# Patient Record
Sex: FEMALE | Race: WHITE | NOT HISPANIC OR LATINO | URBAN - METROPOLITAN AREA
[De-identification: names, ages, dates, MRNs, and addresses within clinical notes are randomized per-mention and may not be internally consistent; named-entity substitution may affect disease eponyms.]

---

## 2017-11-15 ENCOUNTER — OUTPATIENT (OUTPATIENT)
Dept: OUTPATIENT SERVICES | Facility: HOSPITAL | Age: 52
LOS: 1 days | Discharge: HOME | End: 2017-11-15

## 2017-11-15 DIAGNOSIS — Z12.31 ENCOUNTER FOR SCREENING MAMMOGRAM FOR MALIGNANT NEOPLASM OF BREAST: ICD-10-CM

## 2019-03-15 ENCOUNTER — OUTPATIENT (OUTPATIENT)
Dept: OUTPATIENT SERVICES | Facility: HOSPITAL | Age: 54
LOS: 1 days | Discharge: HOME | End: 2019-03-15

## 2019-03-15 DIAGNOSIS — Z12.31 ENCOUNTER FOR SCREENING MAMMOGRAM FOR MALIGNANT NEOPLASM OF BREAST: ICD-10-CM

## 2020-06-10 ENCOUNTER — OUTPATIENT (OUTPATIENT)
Dept: OUTPATIENT SERVICES | Facility: HOSPITAL | Age: 55
LOS: 1 days | Discharge: HOME | End: 2020-06-10
Payer: COMMERCIAL

## 2020-06-10 DIAGNOSIS — Z12.31 ENCOUNTER FOR SCREENING MAMMOGRAM FOR MALIGNANT NEOPLASM OF BREAST: ICD-10-CM

## 2020-06-10 PROCEDURE — 77067 SCR MAMMO BI INCL CAD: CPT | Mod: 26

## 2020-06-10 PROCEDURE — 77063 BREAST TOMOSYNTHESIS BI: CPT | Mod: 26

## 2021-04-24 ENCOUNTER — EMERGENCY (EMERGENCY)
Facility: HOSPITAL | Age: 56
LOS: 0 days | Discharge: HOME | End: 2021-04-24
Attending: EMERGENCY MEDICINE | Admitting: EMERGENCY MEDICINE
Payer: COMMERCIAL

## 2021-04-24 VITALS
HEART RATE: 100 BPM | SYSTOLIC BLOOD PRESSURE: 134 MMHG | OXYGEN SATURATION: 99 % | TEMPERATURE: 99 F | RESPIRATION RATE: 18 BRPM | DIASTOLIC BLOOD PRESSURE: 72 MMHG

## 2021-04-24 VITALS
DIASTOLIC BLOOD PRESSURE: 70 MMHG | RESPIRATION RATE: 18 BRPM | HEART RATE: 88 BPM | SYSTOLIC BLOOD PRESSURE: 137 MMHG | OXYGEN SATURATION: 99 % | TEMPERATURE: 99 F

## 2021-04-24 DIAGNOSIS — K57.32 DIVERTICULITIS OF LARGE INTESTINE WITHOUT PERFORATION OR ABSCESS WITHOUT BLEEDING: ICD-10-CM

## 2021-04-24 DIAGNOSIS — R10.30 LOWER ABDOMINAL PAIN, UNSPECIFIED: ICD-10-CM

## 2021-04-24 DIAGNOSIS — E78.00 PURE HYPERCHOLESTEROLEMIA, UNSPECIFIED: ICD-10-CM

## 2021-04-24 LAB
ALBUMIN SERPL ELPH-MCNC: 5.1 G/DL — SIGNIFICANT CHANGE UP (ref 3.5–5.2)
ALP SERPL-CCNC: 142 U/L — HIGH (ref 30–115)
ALT FLD-CCNC: 74 U/L — HIGH (ref 0–41)
ANION GAP SERPL CALC-SCNC: 13 MMOL/L — SIGNIFICANT CHANGE UP (ref 7–14)
APPEARANCE UR: CLEAR — SIGNIFICANT CHANGE UP
AST SERPL-CCNC: 73 U/L — HIGH (ref 0–41)
BACTERIA # UR AUTO: NEGATIVE — SIGNIFICANT CHANGE UP
BASOPHILS # BLD AUTO: 0.05 K/UL — SIGNIFICANT CHANGE UP (ref 0–0.2)
BASOPHILS NFR BLD AUTO: 0.6 % — SIGNIFICANT CHANGE UP (ref 0–1)
BILIRUB DIRECT SERPL-MCNC: 0.2 MG/DL — SIGNIFICANT CHANGE UP (ref 0–0.2)
BILIRUB INDIRECT FLD-MCNC: 0.8 MG/DL — SIGNIFICANT CHANGE UP (ref 0.2–1.2)
BILIRUB SERPL-MCNC: 1 MG/DL — SIGNIFICANT CHANGE UP (ref 0.2–1.2)
BILIRUB UR-MCNC: NEGATIVE — SIGNIFICANT CHANGE UP
BUN SERPL-MCNC: 12 MG/DL — SIGNIFICANT CHANGE UP (ref 10–20)
CALCIUM SERPL-MCNC: 9.9 MG/DL — SIGNIFICANT CHANGE UP (ref 8.5–10.1)
CHLORIDE SERPL-SCNC: 101 MMOL/L — SIGNIFICANT CHANGE UP (ref 98–110)
CO2 SERPL-SCNC: 25 MMOL/L — SIGNIFICANT CHANGE UP (ref 17–32)
COLOR SPEC: YELLOW — SIGNIFICANT CHANGE UP
COMMENT - URINE: SIGNIFICANT CHANGE UP
COMMENT - URINE: SIGNIFICANT CHANGE UP
CREAT SERPL-MCNC: 0.7 MG/DL — SIGNIFICANT CHANGE UP (ref 0.7–1.5)
DIFF PNL FLD: NEGATIVE — SIGNIFICANT CHANGE UP
EOSINOPHIL # BLD AUTO: 0.09 K/UL — SIGNIFICANT CHANGE UP (ref 0–0.7)
EOSINOPHIL NFR BLD AUTO: 1.1 % — SIGNIFICANT CHANGE UP (ref 0–8)
EPI CELLS # UR: 3 /HPF — SIGNIFICANT CHANGE UP (ref 0–5)
GLUCOSE SERPL-MCNC: 100 MG/DL — HIGH (ref 70–99)
GLUCOSE UR QL: NEGATIVE — SIGNIFICANT CHANGE UP
HCG SERPL QL: NEGATIVE — SIGNIFICANT CHANGE UP
HCT VFR BLD CALC: 41.9 % — SIGNIFICANT CHANGE UP (ref 37–47)
HGB BLD-MCNC: 14.5 G/DL — SIGNIFICANT CHANGE UP (ref 12–16)
HYALINE CASTS # UR AUTO: 4 /LPF — SIGNIFICANT CHANGE UP (ref 0–7)
IMM GRANULOCYTES NFR BLD AUTO: 0.2 % — SIGNIFICANT CHANGE UP (ref 0.1–0.3)
KETONES UR-MCNC: ABNORMAL
LACTATE SERPL-SCNC: 1.2 MMOL/L — SIGNIFICANT CHANGE UP (ref 0.7–2)
LEUKOCYTE ESTERASE UR-ACNC: NEGATIVE — SIGNIFICANT CHANGE UP
LIDOCAIN IGE QN: 43 U/L — SIGNIFICANT CHANGE UP (ref 7–60)
LYMPHOCYTES # BLD AUTO: 1.99 K/UL — SIGNIFICANT CHANGE UP (ref 1.2–3.4)
LYMPHOCYTES # BLD AUTO: 24.5 % — SIGNIFICANT CHANGE UP (ref 20.5–51.1)
MCHC RBC-ENTMCNC: 31.6 PG — HIGH (ref 27–31)
MCHC RBC-ENTMCNC: 34.6 G/DL — SIGNIFICANT CHANGE UP (ref 32–37)
MCV RBC AUTO: 91.3 FL — SIGNIFICANT CHANGE UP (ref 81–99)
MONOCYTES # BLD AUTO: 0.61 K/UL — HIGH (ref 0.1–0.6)
MONOCYTES NFR BLD AUTO: 7.5 % — SIGNIFICANT CHANGE UP (ref 1.7–9.3)
NEUTROPHILS # BLD AUTO: 5.35 K/UL — SIGNIFICANT CHANGE UP (ref 1.4–6.5)
NEUTROPHILS NFR BLD AUTO: 66.1 % — SIGNIFICANT CHANGE UP (ref 42.2–75.2)
NITRITE UR-MCNC: NEGATIVE — SIGNIFICANT CHANGE UP
NRBC # BLD: 0 /100 WBCS — SIGNIFICANT CHANGE UP (ref 0–0)
PH UR: 6.5 — SIGNIFICANT CHANGE UP (ref 5–8)
PLATELET # BLD AUTO: 241 K/UL — SIGNIFICANT CHANGE UP (ref 130–400)
POTASSIUM SERPL-MCNC: 3.9 MMOL/L — SIGNIFICANT CHANGE UP (ref 3.5–5)
POTASSIUM SERPL-SCNC: 3.9 MMOL/L — SIGNIFICANT CHANGE UP (ref 3.5–5)
PROT SERPL-MCNC: 7.4 G/DL — SIGNIFICANT CHANGE UP (ref 6–8)
PROT UR-MCNC: ABNORMAL
RBC # BLD: 4.59 M/UL — SIGNIFICANT CHANGE UP (ref 4.2–5.4)
RBC # FLD: 11.2 % — LOW (ref 11.5–14.5)
RBC CASTS # UR COMP ASSIST: 4 /HPF — SIGNIFICANT CHANGE UP (ref 0–4)
SODIUM SERPL-SCNC: 139 MMOL/L — SIGNIFICANT CHANGE UP (ref 135–146)
SP GR SPEC: 1.04 — HIGH (ref 1.01–1.03)
UROBILINOGEN FLD QL: ABNORMAL
WBC # BLD: 8.11 K/UL — SIGNIFICANT CHANGE UP (ref 4.8–10.8)
WBC # FLD AUTO: 8.11 K/UL — SIGNIFICANT CHANGE UP (ref 4.8–10.8)
WBC UR QL: 2 /HPF — SIGNIFICANT CHANGE UP (ref 0–5)

## 2021-04-24 PROCEDURE — 99285 EMERGENCY DEPT VISIT HI MDM: CPT

## 2021-04-24 PROCEDURE — 74177 CT ABD & PELVIS W/CONTRAST: CPT | Mod: 26,ME

## 2021-04-24 PROCEDURE — G1004: CPT

## 2021-04-24 RX ORDER — METRONIDAZOLE 500 MG
1 TABLET ORAL
Qty: 30 | Refills: 0
Start: 2021-04-24 | End: 2021-05-03

## 2021-04-24 RX ORDER — SODIUM CHLORIDE 9 MG/ML
1000 INJECTION INTRAMUSCULAR; INTRAVENOUS; SUBCUTANEOUS ONCE
Refills: 0 | Status: COMPLETED | OUTPATIENT
Start: 2021-04-24 | End: 2021-04-24

## 2021-04-24 RX ORDER — CIPROFLOXACIN LACTATE 400MG/40ML
1 VIAL (ML) INTRAVENOUS
Qty: 20 | Refills: 0
Start: 2021-04-24 | End: 2021-05-03

## 2021-04-24 RX ORDER — ONDANSETRON 8 MG/1
4 TABLET, FILM COATED ORAL ONCE
Refills: 0 | Status: COMPLETED | OUTPATIENT
Start: 2021-04-24 | End: 2021-04-24

## 2021-04-24 RX ORDER — MORPHINE SULFATE 50 MG/1
4 CAPSULE, EXTENDED RELEASE ORAL ONCE
Refills: 0 | Status: DISCONTINUED | OUTPATIENT
Start: 2021-04-24 | End: 2021-04-24

## 2021-04-24 RX ADMIN — SODIUM CHLORIDE 1000 MILLILITER(S): 9 INJECTION INTRAMUSCULAR; INTRAVENOUS; SUBCUTANEOUS at 14:18

## 2021-04-24 NOTE — ED PROVIDER NOTE - RESPIRATORY NEGATIVE STATEMENT, MLM
Spoke to patient regarding other generic medications used for vulvar dystrophy she stated using hydrocortisone  in past with no success,  Clobetasol propionate prescribed by Dr Emilee Orozco in 2015, confirmed with chart review, worked for a while then stopped.  St no chest pain, no cough, and no shortness of breath.

## 2021-04-24 NOTE — ED PROVIDER NOTE - CARE PROVIDER_API CALL
Migue Cole  Gastroenterology  [address] [place] [address]  Phone: [phone]  Fax: [phone]  Follow Up Time:

## 2021-04-24 NOTE — ED PROVIDER NOTE - CLINICAL SUMMARY MEDICAL DECISION MAKING FREE TEXT BOX
Patient presented with 3 days of LLQ abdominal pain. (+) tender on exam but otherwise afebrile, HD stable, well appearing. Obtained labs which were grossly unremarkable including no significant leukocytosis, anemia, signs of dehydration/JASWINDER or significant electrolyte abnormalities. UA negative for infection. CT abd/pelvis showed (+) uncomplicated sigmoid diverticulitis which is likely etiology of patient's pain. Patient felt much better after tx in ED, and serial abdominal exams benign. Able to tolerate PO. Given the above, will discharge home with PO abx, outpatient follow up. Patient agreeable with plan. Agrees to return to ED for any new or worsening symptoms.

## 2021-04-24 NOTE — ED PROVIDER NOTE - OBJECTIVE STATEMENT
56 y/o female with hx High Cholesterol presents to the ED c/o "I have lower abdominal pain since Wednesday that worsened last night. It hurts to touch or move." no n/v/d/fever/ chills LBM today

## 2021-04-24 NOTE — ED PROVIDER NOTE - PATIENT PORTAL LINK FT
You can access the FollowMyHealth Patient Portal offered by [facility] by registering at the following website: [email or web address]. By joining [facility]’s FollowMyHealth portal, you will also be able to view your health information using other applications (apps) compatible with our system.

## 2021-04-24 NOTE — ED PROVIDER NOTE - ATTENDING CONTRIBUTION TO CARE
55 year old female, pmhx as documented presenting with left lower abdominal pain x 3 days described as achy, non-radiating, no palliative or provocative factors, moderate severity. Denies having this before. Otherwise denies fevers, N/V/D, blood in stool, urinary symptoms, vaginal bleeding/discharge or any other symptoms.    Vital Signs: I have reviewed the initial vital signs.  Constitutional: NAD, well-nourished, appears stated age, no acute distress.  HEENT: Airway patent, moist MM, no erythema/swelling/deformity of oral structures. EOMI, PERRLA.  CV: regular rate, regular rhythm, well-perfused extremities, 2+ b/l DP and radial pulses equal.  Lungs: BCTA, no increased WOB.  ABD: (+) LLQ tenderness, no guarding or rebound, no pulsatile mass, no hernias.   MSK: Neck supple, nontender, nl ROM, no stepoff. Chest nontender. Back nontender in TLS spine or to b/l bony structures or flanks. Ext nontender, nl rom, no deformity.   INTEG: Skin warm, dry, no rash.  NEURO: A&Ox3, normal strength, nl sensation throughout, normal speech.   PSYCH: Calm, cooperative, normal affect and interaction.    Will obtain labs, CT abd/pelvis, UA, IVF, symptomatic control PRN, re-eval.

## 2021-04-25 LAB
CULTURE RESULTS: SIGNIFICANT CHANGE UP
SPECIMEN SOURCE: SIGNIFICANT CHANGE UP

## 2021-08-25 ENCOUNTER — OUTPATIENT (OUTPATIENT)
Dept: OUTPATIENT SERVICES | Facility: HOSPITAL | Age: 56
LOS: 1 days | Discharge: HOME | End: 2021-08-25
Payer: COMMERCIAL

## 2021-08-25 DIAGNOSIS — Z12.31 ENCOUNTER FOR SCREENING MAMMOGRAM FOR MALIGNANT NEOPLASM OF BREAST: ICD-10-CM

## 2021-08-25 PROBLEM — E78.00 PURE HYPERCHOLESTEROLEMIA, UNSPECIFIED: Chronic | Status: ACTIVE | Noted: 2021-04-24

## 2021-08-25 PROCEDURE — 77063 BREAST TOMOSYNTHESIS BI: CPT | Mod: 26

## 2021-08-25 PROCEDURE — 77067 SCR MAMMO BI INCL CAD: CPT | Mod: 26

## 2022-11-09 ENCOUNTER — OUTPATIENT (OUTPATIENT)
Dept: OUTPATIENT SERVICES | Facility: HOSPITAL | Age: 57
LOS: 1 days | Discharge: HOME | End: 2022-11-09

## 2022-11-09 DIAGNOSIS — Z12.31 ENCOUNTER FOR SCREENING MAMMOGRAM FOR MALIGNANT NEOPLASM OF BREAST: ICD-10-CM

## 2022-11-09 PROCEDURE — 77063 BREAST TOMOSYNTHESIS BI: CPT | Mod: 26

## 2022-11-09 PROCEDURE — 77067 SCR MAMMO BI INCL CAD: CPT | Mod: 26

## 2023-10-04 ENCOUNTER — INPATIENT (INPATIENT)
Facility: HOSPITAL | Age: 58
LOS: 1 days | Discharge: HOME | DRG: 392 | End: 2023-10-06
Attending: INTERNAL MEDICINE | Admitting: STUDENT IN AN ORGANIZED HEALTH CARE EDUCATION/TRAINING PROGRAM
Payer: COMMERCIAL

## 2023-10-04 VITALS
WEIGHT: 115.08 LBS | HEIGHT: 59 IN | HEART RATE: 105 BPM | DIASTOLIC BLOOD PRESSURE: 83 MMHG | OXYGEN SATURATION: 100 % | SYSTOLIC BLOOD PRESSURE: 175 MMHG | TEMPERATURE: 99 F | RESPIRATION RATE: 18 BRPM

## 2023-10-04 DIAGNOSIS — K57.32 DIVERTICULITIS OF LARGE INTESTINE WITHOUT PERFORATION OR ABSCESS WITHOUT BLEEDING: ICD-10-CM

## 2023-10-04 LAB
ALBUMIN SERPL ELPH-MCNC: 5.1 G/DL — SIGNIFICANT CHANGE UP (ref 3.5–5.2)
ALP SERPL-CCNC: 102 U/L — SIGNIFICANT CHANGE UP (ref 30–115)
ALT FLD-CCNC: 19 U/L — SIGNIFICANT CHANGE UP (ref 0–41)
ANION GAP SERPL CALC-SCNC: 12 MMOL/L — SIGNIFICANT CHANGE UP (ref 7–14)
APPEARANCE UR: CLEAR — SIGNIFICANT CHANGE UP
AST SERPL-CCNC: 23 U/L — SIGNIFICANT CHANGE UP (ref 0–41)
BASOPHILS # BLD AUTO: 0.03 K/UL — SIGNIFICANT CHANGE UP (ref 0–0.2)
BASOPHILS NFR BLD AUTO: 0.3 % — SIGNIFICANT CHANGE UP (ref 0–1)
BILIRUB SERPL-MCNC: 1.3 MG/DL — HIGH (ref 0.2–1.2)
BILIRUB UR-MCNC: NEGATIVE — SIGNIFICANT CHANGE UP
BUN SERPL-MCNC: 9 MG/DL — LOW (ref 10–20)
CALCIUM SERPL-MCNC: 9.8 MG/DL — SIGNIFICANT CHANGE UP (ref 8.4–10.5)
CHLORIDE SERPL-SCNC: 103 MMOL/L — SIGNIFICANT CHANGE UP (ref 98–110)
CO2 SERPL-SCNC: 24 MMOL/L — SIGNIFICANT CHANGE UP (ref 17–32)
COLOR SPEC: YELLOW — SIGNIFICANT CHANGE UP
CREAT SERPL-MCNC: 0.7 MG/DL — SIGNIFICANT CHANGE UP (ref 0.7–1.5)
DIFF PNL FLD: NEGATIVE — SIGNIFICANT CHANGE UP
EGFR: 100 ML/MIN/1.73M2 — SIGNIFICANT CHANGE UP
EOSINOPHIL # BLD AUTO: 0.03 K/UL — SIGNIFICANT CHANGE UP (ref 0–0.7)
EOSINOPHIL NFR BLD AUTO: 0.3 % — SIGNIFICANT CHANGE UP (ref 0–8)
EPI CELLS # UR: PRESENT
GLUCOSE SERPL-MCNC: 117 MG/DL — HIGH (ref 70–99)
GLUCOSE UR QL: NEGATIVE MG/DL — SIGNIFICANT CHANGE UP
HCT VFR BLD CALC: 41.9 % — SIGNIFICANT CHANGE UP (ref 37–47)
HGB BLD-MCNC: 14.5 G/DL — SIGNIFICANT CHANGE UP (ref 12–16)
IMM GRANULOCYTES NFR BLD AUTO: 0.3 % — SIGNIFICANT CHANGE UP (ref 0.1–0.3)
KETONES UR-MCNC: NEGATIVE MG/DL — SIGNIFICANT CHANGE UP
LACTATE SERPL-SCNC: 1.6 MMOL/L — SIGNIFICANT CHANGE UP (ref 0.7–2)
LEUKOCYTE ESTERASE UR-ACNC: ABNORMAL
LIDOCAIN IGE QN: 59 U/L — SIGNIFICANT CHANGE UP (ref 7–60)
LYMPHOCYTES # BLD AUTO: 1.3 K/UL — SIGNIFICANT CHANGE UP (ref 1.2–3.4)
LYMPHOCYTES # BLD AUTO: 13.7 % — LOW (ref 20.5–51.1)
MCHC RBC-ENTMCNC: 31.2 PG — HIGH (ref 27–31)
MCHC RBC-ENTMCNC: 34.6 G/DL — SIGNIFICANT CHANGE UP (ref 32–37)
MCV RBC AUTO: 90.1 FL — SIGNIFICANT CHANGE UP (ref 81–99)
MONOCYTES # BLD AUTO: 0.6 K/UL — SIGNIFICANT CHANGE UP (ref 0.1–0.6)
MONOCYTES NFR BLD AUTO: 6.3 % — SIGNIFICANT CHANGE UP (ref 1.7–9.3)
NEUTROPHILS # BLD AUTO: 7.49 K/UL — HIGH (ref 1.4–6.5)
NEUTROPHILS NFR BLD AUTO: 79.1 % — HIGH (ref 42.2–75.2)
NITRITE UR-MCNC: NEGATIVE — SIGNIFICANT CHANGE UP
NRBC # BLD: 0 /100 WBCS — SIGNIFICANT CHANGE UP (ref 0–0)
PH UR: 7.5 — SIGNIFICANT CHANGE UP (ref 5–8)
PLATELET # BLD AUTO: 296 K/UL — SIGNIFICANT CHANGE UP (ref 130–400)
PMV BLD: 9.4 FL — SIGNIFICANT CHANGE UP (ref 7.4–10.4)
POTASSIUM SERPL-MCNC: 4 MMOL/L — SIGNIFICANT CHANGE UP (ref 3.5–5)
POTASSIUM SERPL-SCNC: 4 MMOL/L — SIGNIFICANT CHANGE UP (ref 3.5–5)
PROT SERPL-MCNC: 7.2 G/DL — SIGNIFICANT CHANGE UP (ref 6–8)
PROT UR-MCNC: NEGATIVE MG/DL — SIGNIFICANT CHANGE UP
RBC # BLD: 4.65 M/UL — SIGNIFICANT CHANGE UP (ref 4.2–5.4)
RBC # FLD: 10.8 % — LOW (ref 11.5–14.5)
RBC CASTS # UR COMP ASSIST: 1 /HPF — SIGNIFICANT CHANGE UP (ref 0–4)
SODIUM SERPL-SCNC: 139 MMOL/L — SIGNIFICANT CHANGE UP (ref 135–146)
SP GR SPEC: 1.01 — SIGNIFICANT CHANGE UP (ref 1–1.03)
UROBILINOGEN FLD QL: 0.2 MG/DL — SIGNIFICANT CHANGE UP (ref 0.2–1)
WBC # BLD: 9.48 K/UL — SIGNIFICANT CHANGE UP (ref 4.8–10.8)
WBC # FLD AUTO: 9.48 K/UL — SIGNIFICANT CHANGE UP (ref 4.8–10.8)
WBC UR QL: 1 /HPF — SIGNIFICANT CHANGE UP (ref 0–5)

## 2023-10-04 PROCEDURE — 85025 COMPLETE CBC W/AUTO DIFF WBC: CPT

## 2023-10-04 PROCEDURE — 83735 ASSAY OF MAGNESIUM: CPT

## 2023-10-04 PROCEDURE — 86803 HEPATITIS C AB TEST: CPT

## 2023-10-04 PROCEDURE — 80053 COMPREHEN METABOLIC PANEL: CPT

## 2023-10-04 PROCEDURE — 99222 1ST HOSP IP/OBS MODERATE 55: CPT

## 2023-10-04 PROCEDURE — 99285 EMERGENCY DEPT VISIT HI MDM: CPT

## 2023-10-04 PROCEDURE — 74177 CT ABD & PELVIS W/CONTRAST: CPT | Mod: 26,MA

## 2023-10-04 PROCEDURE — 36415 COLL VENOUS BLD VENIPUNCTURE: CPT

## 2023-10-04 RX ORDER — ONDANSETRON 8 MG/1
4 TABLET, FILM COATED ORAL ONCE
Refills: 0 | Status: COMPLETED | OUTPATIENT
Start: 2023-10-04 | End: 2023-10-04

## 2023-10-04 RX ORDER — SODIUM CHLORIDE 9 MG/ML
1000 INJECTION INTRAMUSCULAR; INTRAVENOUS; SUBCUTANEOUS ONCE
Refills: 0 | Status: COMPLETED | OUTPATIENT
Start: 2023-10-04 | End: 2023-10-04

## 2023-10-04 RX ORDER — METRONIDAZOLE 500 MG
500 TABLET ORAL EVERY 8 HOURS
Refills: 0 | Status: DISCONTINUED | OUTPATIENT
Start: 2023-10-04 | End: 2023-10-06

## 2023-10-04 RX ORDER — METRONIDAZOLE 500 MG
500 TABLET ORAL ONCE
Refills: 0 | Status: COMPLETED | OUTPATIENT
Start: 2023-10-04 | End: 2023-10-04

## 2023-10-04 RX ORDER — ONDANSETRON 8 MG/1
4 TABLET, FILM COATED ORAL EVERY 8 HOURS
Refills: 0 | Status: DISCONTINUED | OUTPATIENT
Start: 2023-10-04 | End: 2023-10-06

## 2023-10-04 RX ORDER — FAMOTIDINE 10 MG/ML
1 INJECTION INTRAVENOUS
Refills: 0 | DISCHARGE

## 2023-10-04 RX ORDER — CIPROFLOXACIN LACTATE 400MG/40ML
400 VIAL (ML) INTRAVENOUS ONCE
Refills: 0 | Status: COMPLETED | OUTPATIENT
Start: 2023-10-04 | End: 2023-10-04

## 2023-10-04 RX ORDER — KETOROLAC TROMETHAMINE 30 MG/ML
30 SYRINGE (ML) INJECTION ONCE
Refills: 0 | Status: DISCONTINUED | OUTPATIENT
Start: 2023-10-04 | End: 2023-10-04

## 2023-10-04 RX ORDER — CIPROFLOXACIN LACTATE 400MG/40ML
400 VIAL (ML) INTRAVENOUS EVERY 12 HOURS
Refills: 0 | Status: DISCONTINUED | OUTPATIENT
Start: 2023-10-04 | End: 2023-10-06

## 2023-10-04 RX ORDER — ENOXAPARIN SODIUM 100 MG/ML
40 INJECTION SUBCUTANEOUS EVERY 24 HOURS
Refills: 0 | Status: DISCONTINUED | OUTPATIENT
Start: 2023-10-04 | End: 2023-10-06

## 2023-10-04 RX ORDER — ACETAMINOPHEN 500 MG
650 TABLET ORAL EVERY 6 HOURS
Refills: 0 | Status: DISCONTINUED | OUTPATIENT
Start: 2023-10-04 | End: 2023-10-06

## 2023-10-04 RX ORDER — ROSUVASTATIN CALCIUM 5 MG/1
1 TABLET ORAL
Refills: 0 | DISCHARGE

## 2023-10-04 RX ORDER — MORPHINE SULFATE 50 MG/1
2 CAPSULE, EXTENDED RELEASE ORAL ONCE
Refills: 0 | Status: DISCONTINUED | OUTPATIENT
Start: 2023-10-04 | End: 2023-10-04

## 2023-10-04 RX ADMIN — Medication 100 MILLIGRAM(S): at 21:16

## 2023-10-04 RX ADMIN — Medication 100 MILLIGRAM(S): at 14:40

## 2023-10-04 RX ADMIN — Medication 30 MILLIGRAM(S): at 17:18

## 2023-10-04 RX ADMIN — Medication 30 MILLIGRAM(S): at 16:16

## 2023-10-04 RX ADMIN — SODIUM CHLORIDE 1000 MILLILITER(S): 9 INJECTION INTRAMUSCULAR; INTRAVENOUS; SUBCUTANEOUS at 11:43

## 2023-10-04 RX ADMIN — Medication 200 MILLIGRAM(S): at 12:58

## 2023-10-04 RX ADMIN — Medication 200 MILLIGRAM(S): at 17:36

## 2023-10-04 NOTE — H&P ADULT - ATTENDING COMMENTS
ROS and Physical exam as above.  Assessment and plan updated.   I edited the note.  Plan discussed with patient and  on 3B.

## 2023-10-04 NOTE — H&P ADULT - ASSESSMENT
58-year-old female past medical history of GERD, hyperlipidemia, presents with lower abdominal pain that started last night. Patient reported that 2 weeks ago she developed RLQ abdominal pain, she saw her GI Dr. Cole who prescribed her 2 antibiotics ( does not recall name ) which she took for 10 days. Her sxs resolved but she started having loose BM after that so she contacted him again and he prescribed Bentyl which helped her bowel movements become more formed. Yesterday night she started having bilateral lower quadrant intermittent pain again. No fever, chills, nausea, vomiting.     #Abdominal pain 2/2 Sigmoid diverticulitis   - CT A.P with IV contrast:   Sigmoid colonic diverticulitis.  - not septic on admission   - no fever or leukocytosis   - s/p OP tx with Abx for 10 days   - s/p cipro and flagyl in ED   - c/w cipro and flagyl   - pain control   - zofran PRN   - liquid diet for now, advance as tolerated   - send stool studies if develops diarrhea     #HDL   - c/w rosuvastatin 10 mg OD     #GERD   - c/w famotidine     #DVT ppx: lovenox   #GI ppx: famotidine   #Diet: liquid diet   #Activity: as tolerated  58-year-old female past medical history of GERD, hyperlipidemia, presents with lower abdominal pain that started last night. Patient reported that 2 weeks ago she developed RLQ abdominal pain, she saw her GI Dr. Cole who prescribed her 2 antibiotics ( does not recall name ) which she took for 10 days. Her sxs resolved but she started having loose BM after that so she contacted him again and he prescribed Bentyl which helped her bowel movements become more formed. Yesterday night she started having bilateral lower quadrant intermittent pain again. No fever, chills, nausea, vomiting.     # Abdominal pain 2/2 Acute uncomplicated Sigmoid diverticulitis:  - patient is hemodynamically stable, no fever or leukocytosis , no speiss on admission   - CT A.P with IV contrast: Sigmoid colonic diverticulitis.  - s/p OP tx with Abx for 10 days   - s/p cipro and flagyl in ED   - c/w cipro and flagyl   - pain control   - zofran PRN   - liquid diet for now, advance as tolerated   - send stool studies if develops diarrhea   - follow up with primary GI as outpatient, reports last colonoscopy 2 years ago     #HDL   - c/w rosuvastatin 10 mg OD     #GERD   - c/w famotidine     #DVT ppx: lovenox   #GI ppx: famotidine   #Diet: liquid diet   #Activity: as tolerated

## 2023-10-04 NOTE — ED ADULT TRIAGE NOTE - MODE OF ARRIVAL
[employer] in [employer] Pregnancy And Lactation Text: This medication is Pregnancy Category D and is not considered safe during pregnancy.  The risk during breast feeding is also uncertain.

## 2023-10-04 NOTE — ED PROVIDER NOTE - OBJECTIVE STATEMENT
58-year-old female past medical history of GERD, hyperlipidemia, presents with lower abdominal pain/pressure.  Patient says started last night.  Patient admits was recently treated for diverticulitis, with right lower quadrant pain, by her GI Dr. Cole.  Patient says she was on 2 different antibiotics and took for 10 days.  Patient says after the antibiotics, her pain improved, however had developed some loose stool.  Patient followed up with her GI again and was started on Bentyl which helped her bowel movements become more formed.  Patient denies any nausea, vomiting.  Patient has no fever or chills.  Patient has no urinary symptoms.  Patient has no flank pain.  Patient has a history of C-sections.

## 2023-10-04 NOTE — ED PROVIDER NOTE - DIFFERENTIAL DIAGNOSIS
Rendering Text In Billing: The slide was read, and reported in an attached document. uti  diverticulitis Differential Diagnosis

## 2023-10-04 NOTE — H&P ADULT - HISTORY OF PRESENT ILLNESS
58-year-old female past medical history of GERD, hyperlipidemia, presents with lower abdominal pain/pressure.  Patient says started last night.  Patient admits was recently treated for diverticulitis, with right lower quadrant pain, by her GI Dr. Cole.  Patient says she was on 2 different antibiotics and took for 10 days.  Patient says after the antibiotics, her pain improved, however had developed some loose stool.  Patient followed up with her GI again and was started on Bentyl which helped her bowel movements become more formed.  Patient denies any nausea, vomiting.  Patient has no fever or chills.  Patient has no urinary symptoms.  Patient has no flank pain. 58-year-old female past medical history of GERD, hyperlipidemia, presents with lower abdominal pain that started last night. Patient reported that 2 weeks ago she developed RLQ abdominal pain, she saw her GI Dr. Cole who prescribed her 2 antibiotics ( does not recall name ) which she took for 10 days. Her sxs resolved but she started having loose BM after that so she contacted him again and he prescribed Bentyl which helped her bowel movements become more formed. Yesterday night she started having bilateral lower quadrant intermittent pain again. No fever, chills, nausea, vomiting. Last colonoscopy done < 2 yrs ago.     In ED vitals: T 99.1, , /83, spo2 100% on RA   Labs: unremarkable   CT A.P with IV contrast:   Sigmoid colonic diverticulitis.    s/p cipro and flagyl in ED

## 2023-10-04 NOTE — ED ADULT TRIAGE NOTE - CHIEF COMPLAINT QUOTE
Patient ambulatory to the ER with a hx of diverticulitis, treated 2 weeks ago for a flare-up. Patient today reports new onset lower abdominal pain.

## 2023-10-04 NOTE — H&P ADULT - NSHPLABSRESULTS_GEN_ALL_CORE
LABS:  cret                        14.5   9.48  )-----------( 296      ( 04 Oct 2023 11:30 )             41.9     10-04    139  |  103  |  9<L>  ----------------------------<  117<H>  4.0   |  24  |  0.7    Ca    9.8      04 Oct 2023 11:30    TPro  7.2  /  Alb  5.1  /  TBili  1.3<H>  /  DBili  x   /  AST  23  /  ALT  19  /  AlkPhos  102  10-04    ACC: [id number] EXAM:  CT ABDOMEN AND PELVIS IC   ORDERED BY: ROBERTO CARLIN     PROCEDURE DATE:  10/04/2023          INTERPRETATION:  CLINICAL STATEMENT: Left lower quadrant pain.    TECHNIQUE: Contiguous axial CT images were obtained from the lower chest   to the pubic symphysis following administration of 100cc Omnipaque 350   intravenous contrast.  Oral contrast was not administered.  Reformatted   images in the coronal and sagittal planes were acquired.    COMPARISON CT: Abdomen and pelvis 4/24/2021.    OTHER STUDIES USED FOR CORRELATION: None.      FINDINGS:    LOWER CHEST: Unremarkable.    HEPATOBILIARY: Unremarkable.    SPLEEN: Unremarkable.    PANCREAS: Unremarkable.    ADRENAL GLANDS: Unremarkable.    KIDNEYS: Symmetric enhancement. No hydronephrosis. Left parapelvic cysts.    ABDOMINOPELVIC NODES: Unremarkable.    PELVIC ORGANS: The urinary bladder wall is thickened which may be in part   due to underdistention.    PERITONEUM/MESENTERY/BOWEL: No small bowel obstruction, ascites or free   intraperitoneal air. Normal appendix. Colonic diverticulosis with a focal   region of wall edema and surrounding reactive changes in the sigmoid   colon. No evidence of abscess.    BONES/SOFT TISSUES: Degenerative changes of the osseous structures.    IMPRESSION:    Sigmoid colonic diverticulitis.

## 2023-10-04 NOTE — H&P ADULT - NSHPPHYSICALEXAM_GEN_ALL_CORE
LOS:     VITALS:   T(C): 37.3 (10-04-23 @ 09:50), Max: 37.3 (10-04-23 @ 09:50)  HR: 105 (10-04-23 @ 09:50) (105 - 105)  BP: 175/83 (10-04-23 @ 09:50) (175/83 - 175/83)  RR: 18 (10-04-23 @ 09:50) (18 - 18)  SpO2: 100% (10-04-23 @ 09:50) (100% - 100%)    GENERAL: NAD, lying in bed comfortably  HEAD:  Atraumatic, Normocephalic  EYES: EOMI, PERRLA, conjunctiva and sclera clear  ENT: Moist mucous membranes  NECK: Supple, No JVD  CHEST/LUNG: Clear to auscultation bilaterally; No rales, rhonchi, wheezing, or rubs. Unlabored respirations  HEART: Regular rate and rhythm; No murmurs, rubs, or gallops  ABDOMEN: BSx4; Soft, nontender, nondistended  EXTREMITIES:  2+ Peripheral Pulses, brisk capillary refill. No clubbing, cyanosis, or edema  NERVOUS SYSTEM:  A&Ox3, 5/5 motor bilateral upper and lower extremity, no sensory deficit   SKIN: No rashes or lesions LOS:     VITALS:   T(C): 37.3 (10-04-23 @ 09:50), Max: 37.3 (10-04-23 @ 09:50)  HR: 105 (10-04-23 @ 09:50) (105 - 105)  BP: 175/83 (10-04-23 @ 09:50) (175/83 - 175/83)  RR: 18 (10-04-23 @ 09:50) (18 - 18)  SpO2: 100% (10-04-23 @ 09:50) (100% - 100%)    GENERAL: NAD, lying in bed comfortably  HEAD:  Atraumatic, Normocephalic  EYES: EOMI, PERRLA, conjunctiva and sclera clear  ENT: Moist mucous membranes  NECK: Supple, No JVD  CHEST/LUNG: Clear to auscultation bilaterally;   HEART: Regular rate and rhythm; No murmurs, rubs, or gallops  ABDOMEN: BSx4; Soft,  nondistended, lower quadrant tenderness more on LLQ   EXTREMITIES:  2+ Peripheral Pulses, no edema  NERVOUS SYSTEM:  A&Ox3, 5/5 motor bilateral upper and lower extremity, no sensory deficit   SKIN: No rashes or lesions

## 2023-10-04 NOTE — ED PROVIDER NOTE - CLINICAL SUMMARY MEDICAL DECISION MAKING FREE TEXT BOX
Patient with lower abdominal pain, had been treated for diverticulitis outpatient, still with sigmoid diverticulitis on CAT scan, failure of outpatient treatment.  Started on IV antibiotics.  Labs reassuring.  Will admit to medicine.  Any ordered labs and EKG were reviewed.  Any imaging was ordered and reviewed by me.  Appropriate medications for patient's presenting complaints were ordered and effects were reassessed.  Patient's records (prior hospital, ED visit, and/or nursing home notes if available) were reviewed.  Additional history was obtained from EMS, family, and/or PCP (where available).  Escalation to admission/observation was considered.  Patient requires inpatient hospitalization - monitored setting.

## 2023-10-04 NOTE — ED PROVIDER NOTE - PHYSICAL EXAMINATION
VITAL SIGNS: I have reviewed nursing notes and confirm.  CONSTITUTIONAL: Well-developed; well-nourished; in no acute distress.  SKIN: Skin exam is warm and dry, no acute rash.  HEAD: Normocephalic; atraumatic.  EYES: PERRL, EOM intact; conjunctiva and sclera clear.  ENT: No nasal discharge; airway clear. TMs clear.  NECK: Supple; non tender.  CARD: S1, S2 normal; no murmurs, gallops, or rubs. Regular rate and rhythm.  RESP: No wheezes, rales or rhonchi.  ABD: Normal bowel sounds; soft; mild tenderness left lower quadrant and suprapubic area, minimally distended, no CVA tenderness  EXT: Normal ROM. No clubbing, cyanosis or edema.

## 2023-10-04 NOTE — ED ADULT NURSE NOTE - NSFALLUNIVINTERV_ED_ALL_ED
Bed/Stretcher in lowest position, wheels locked, appropriate side rails in place/Call bell, personal items and telephone in reach/Instruct patient to call for assistance before getting out of bed/chair/stretcher/Non-slip footwear applied when patient is off stretcher/[place] to call system/Physically safe environment - no spills, clutter or unnecessary equipment/Purposeful proactive rounding/Room/bathroom lighting operational, light cord in reach

## 2023-10-05 LAB
HCV AB S/CO SERPL IA: 0.04 COI — SIGNIFICANT CHANGE UP
HCV AB SERPL-IMP: SIGNIFICANT CHANGE UP

## 2023-10-05 PROCEDURE — 99233 SBSQ HOSP IP/OBS HIGH 50: CPT

## 2023-10-05 RX ORDER — ATORVASTATIN CALCIUM 80 MG/1
80 TABLET, FILM COATED ORAL AT BEDTIME
Refills: 0 | Status: DISCONTINUED | OUTPATIENT
Start: 2023-10-05 | End: 2023-10-06

## 2023-10-05 RX ORDER — CHLORHEXIDINE GLUCONATE 213 G/1000ML
1 SOLUTION TOPICAL
Refills: 0 | Status: DISCONTINUED | OUTPATIENT
Start: 2023-10-05 | End: 2023-10-06

## 2023-10-05 RX ORDER — FAMOTIDINE 10 MG/ML
20 INJECTION INTRAVENOUS
Refills: 0 | Status: DISCONTINUED | OUTPATIENT
Start: 2023-10-05 | End: 2023-10-06

## 2023-10-05 RX ORDER — LACTOBACILLUS ACIDOPHILUS 100MM CELL
1 CAPSULE ORAL
Refills: 0 | Status: DISCONTINUED | OUTPATIENT
Start: 2023-10-05 | End: 2023-10-06

## 2023-10-05 RX ORDER — KETOROLAC TROMETHAMINE 30 MG/ML
15 SYRINGE (ML) INJECTION EVERY 6 HOURS
Refills: 0 | Status: DISCONTINUED | OUTPATIENT
Start: 2023-10-05 | End: 2023-10-06

## 2023-10-05 RX ORDER — INFLUENZA VIRUS VACCINE 15; 15; 15; 15 UG/.5ML; UG/.5ML; UG/.5ML; UG/.5ML
0.5 SUSPENSION INTRAMUSCULAR ONCE
Refills: 0 | Status: DISCONTINUED | OUTPATIENT
Start: 2023-10-05 | End: 2023-10-06

## 2023-10-05 RX ADMIN — Medication 200 MILLIGRAM(S): at 05:19

## 2023-10-05 RX ADMIN — Medication 200 MILLIGRAM(S): at 17:03

## 2023-10-05 RX ADMIN — Medication 100 MILLIGRAM(S): at 13:21

## 2023-10-05 RX ADMIN — Medication 1 TABLET(S): at 17:02

## 2023-10-05 RX ADMIN — Medication 100 MILLIGRAM(S): at 05:22

## 2023-10-05 RX ADMIN — FAMOTIDINE 20 MILLIGRAM(S): 10 INJECTION INTRAVENOUS at 17:03

## 2023-10-05 RX ADMIN — ATORVASTATIN CALCIUM 80 MILLIGRAM(S): 80 TABLET, FILM COATED ORAL at 21:19

## 2023-10-05 RX ADMIN — Medication 100 MILLIGRAM(S): at 21:19

## 2023-10-05 NOTE — PROGRESS NOTE ADULT - ASSESSMENT
PHYSICAL EXAM:    GENERAL:   ( x ) NAD, lying in bed comfortably     (  ) obtunded     (  ) lethargic     (  ) somnolent    HEAD:   ( x ) Atraumatic     (  ) hematoma     (  ) laceration (specify location:       )     NECK:  ( x ) Supple     (  ) neck stiffness     (  ) nuchal rigidity     (  )  no JVD     (  ) JVD present ( -- cm)    HEART:  Rate -->     ( x ) normal rate     (  ) bradycardic     (  ) tachycardic  Rhythm -->     ( x ) regular     (  ) regularly irregular     (  ) irregularly irregular  Murmurs -->     ( x ) normal s1s2     (  ) systolic murmur     (  ) diastolic murmur     (  ) continuous murmur      (  ) S3 present     (  ) S4 present    LUNGS:   ( x ) Unlabored respirations     (  ) tachypnea  (  ) B/L air entry     (  ) decreased breath sounds in:  (location     )    ( x ) no adventitious sound     (  ) crackles     (  ) wheezing      (  ) rhonchi      (specify location:       )  (  ) chest wall tenderness (specify location:       )    ABDOMEN: mild tenderness on palpation in b/l lower quadrants  ( x ) Soft     (  ) tense   |   (  ) nondistended     (  ) distended      ( x ) +BS     (  ) hypoactive bowel sounds     (  ) hyperactive bowel sounds  (  ) nontender     (  ) RUQ tenderness     (  ) RLQ tenderness     (  ) LLQ tenderness     (  ) epigastric tenderness     (  ) diffuse tenderness  (  ) Splenomegaly      (  ) Hepatomegaly      (  ) Jaundice     (  ) ecchymosis     EXTREMITIES:  ( x ) Normal     (  ) Rash     (  ) ecchymosis     (  ) varicose veins      (  ) pitting edema     (  ) non-pitting edema   (  ) ulceration     (  ) gangrene:     (location:     )    NERVOUS SYSTEM:    ( x ) A&Ox3     (  ) confused     (  ) lethargic  CN II-XII:     ( x ) Intact     (  ) deficits found     (Specify:     )   Upper extremities:     ( x ) no sensorimotor deficits     (  ) weakness     (  ) loss of proprioception/vibration     (  ) loss of touch/temperature (specify:    )  Lower extremities:     ( x ) no sensorimotor deficits     (  ) weakness     (  ) loss of proprioception/vibration     (  ) loss of touch/temperature (specify:    )    SKIN:   ( x ) No rashes or lesions     (  ) maculopapular rash     (  ) pustules     (  ) vesicles     (  ) ulcer     (  ) ecchymosis     (specify location:     )    (  ) Indwelling Webster Catheter:   Date insterted:    Reason (  ) Critical illness     (  ) urinary retention    (  ) Accurate Ins/Outs Monitoring     (  ) CMO patient    ASSESSMENT:  58-year-old female past medical history of GERD, hyperlipidemia, presents with lower abdominal pain that started last night. Patient reported that 2 weeks ago she developed RLQ abdominal pain, she saw her GI Dr. Cole who prescribed her Cipro 500 and Metron 250 which she took for 10 days. Her sxs resolved but she started having loose BM after that so she contacted him again and he prescribed Bentyl which helped her bowel movements become more formed. Yesterday night she started having bilateral lower quadrant intermittent pain again. No fever, chills, nausea, vomiting.     PLAN:  # Abdominal pain 2/2 Acute uncomplicated Sigmoid diverticulitis:  - patient is hemodynamically stable, no fever or leukocytosis , no sepsis on admission   - CT A.P with IV contrast: Colonic diverticulosis with focal region of wall edema and surrounding reactive changes in sigmoid colon suggestive of sigmoid colonic diverticulitis with no evidence of abscess  - s/p Outpatient tx with Abx for 10 days   - c/w cipro 400 mg q12 and flagyl 500 mg q8 IV  - plan to discharge on PO Cipro 500 q12 and Flagyl 500 q8 for 7 days  - zofran PRN   - liquid diet, advanced to DASH/TLC  - plan send stool studies if develops diarrhea   - follow up with primary GI as outpatient, reports last colonoscopy 2 years ago     # HDL   - c/w rosuvastatin 10 mg OD     # GERD   - c/w famotidine     Bowel  - Feeds: DASH/TLC  - Protonics: Famotidine  - Regimen: None  - Last Bowel Movement: Yesterday, well formed than before, had loose stools     Activity: Ambulating  Code status: Full  DVT prophylaxis: Enoxaparin  IV fluids: s/p NS bolus in ED  O2 support: None  Antibiotics: Cipro and Metron  Disposition: Home PHYSICAL EXAM:    GENERAL:   ( x ) NAD, lying in bed comfortably     (  ) obtunded     (  ) lethargic     (  ) somnolent    HEAD:   ( x ) Atraumatic     (  ) hematoma     (  ) laceration (specify location:       )     NECK:  ( x ) Supple     (  ) neck stiffness     (  ) nuchal rigidity     (  )  no JVD     (  ) JVD present ( -- cm)    HEART:  Rate -->     ( x ) normal rate     (  ) bradycardic     (  ) tachycardic  Rhythm -->     ( x ) regular     (  ) regularly irregular     (  ) irregularly irregular  Murmurs -->     ( x ) normal s1s2     (  ) systolic murmur     (  ) diastolic murmur     (  ) continuous murmur      (  ) S3 present     (  ) S4 present    LUNGS:   ( x ) Unlabored respirations     (  ) tachypnea  (  ) B/L air entry     (  ) decreased breath sounds in:  (location     )    ( x ) no adventitious sound     (  ) crackles     (  ) wheezing      (  ) rhonchi      (specify location:       )  (  ) chest wall tenderness (specify location:       )    ABDOMEN: mild tenderness on palpation in b/l lower quadrants  ( x ) Soft     (  ) tense   |   (  ) nondistended     (  ) distended      ( x ) +BS     (  ) hypoactive bowel sounds     (  ) hyperactive bowel sounds  (  ) nontender     (  ) RUQ tenderness     (  ) RLQ tenderness     (  ) LLQ tenderness     (  ) epigastric tenderness     (  ) diffuse tenderness  (  ) Splenomegaly      (  ) Hepatomegaly      (  ) Jaundice     (  ) ecchymosis     EXTREMITIES:  ( x ) Normal     (  ) Rash     (  ) ecchymosis     (  ) varicose veins      (  ) pitting edema     (  ) non-pitting edema   (  ) ulceration     (  ) gangrene:     (location:     )    NERVOUS SYSTEM:    ( x ) A&Ox3     (  ) confused     (  ) lethargic  CN II-XII:     ( x ) Intact     (  ) deficits found     (Specify:     )   Upper extremities:     ( x ) no sensorimotor deficits     (  ) weakness     (  ) loss of proprioception/vibration     (  ) loss of touch/temperature (specify:    )  Lower extremities:     ( x ) no sensorimotor deficits     (  ) weakness     (  ) loss of proprioception/vibration     (  ) loss of touch/temperature (specify:    )    SKIN:   ( x ) No rashes or lesions     (  ) maculopapular rash     (  ) pustules     (  ) vesicles     (  ) ulcer     (  ) ecchymosis     (specify location:     )    (  ) Indwelling Webster Catheter:   Date insterted:    Reason (  ) Critical illness     (  ) urinary retention    (  ) Accurate Ins/Outs Monitoring     (  ) CMO patient    ASSESSMENT:  58-year-old female past medical history of GERD, hyperlipidemia, presents with lower abdominal pain that started last night. Patient reported that 2 weeks ago she developed RLQ abdominal pain, she saw her GI Dr. Cole who prescribed her Cipro 500 and Metron 250 which she took for 10 days. Her sxs resolved but she started having loose BM after that so she contacted him again and he prescribed Bentyl which helped her bowel movements become more formed. Yesterday night she started having bilateral lower quadrant intermittent pain again. No fever, chills, nausea, vomiting.     PLAN:  # Abdominal pain 2/2 Acute uncomplicated Sigmoid diverticulitis:  - patient is hemodynamically stable, no fever or leukocytosis , no sepsis on admission   - CT A.P with IV contrast: Colonic diverticulosis with focal region of wall edema and surrounding reactive changes in sigmoid colon suggestive of sigmoid colonic diverticulitis with no evidence of abscess  - s/p Outpatient tx with Abx for 10 days   - c/w cipro 400 mg q12 and flagyl 500 mg q8 IV  - plan to discharge on PO Cipro 500 q12 and Flagyl 500 q8 for 7 days  - zofran PRN   - liquid diet, advanced to DASH/TLC  - plan send stool studies if develops diarrhea   - follow up with primary GI as outpatient, reports last colonoscopy 2 years ago     # HDL   - c/w rosuvastatin 10 mg OD     # GERD   - c/w famotidine     Bowel  - Feeds: DASH/TLC  - Protonics: Famotidine  - Regimen: None  - Last Bowel Movement: Yesterday, well formed than before, had loose stools     Activity: Ambulating  Code status: Full  DVT prophylaxis: Enoxaparin  IV fluids: s/p NS bolus in ED  O2 support: None  Antibiotics: Cipro and Metron  Disposition: Home    TO FOLLOW UP  - Pending Urine culture and Hep C Antibody screen results PHYSICAL EXAM:    GENERAL:   ( x ) NAD, lying in bed comfortably     (  ) obtunded     (  ) lethargic     (  ) somnolent    HEAD:   ( x ) Atraumatic     (  ) hematoma     (  ) laceration (specify location:       )     NECK:  ( x ) Supple     (  ) neck stiffness     (  ) nuchal rigidity     (  )  no JVD     (  ) JVD present ( -- cm)    HEART:  Rate -->     ( x ) normal rate     (  ) bradycardic     (  ) tachycardic  Rhythm -->     ( x ) regular     (  ) regularly irregular     (  ) irregularly irregular  Murmurs -->     ( x ) normal s1s2     (  ) systolic murmur     (  ) diastolic murmur     (  ) continuous murmur      (  ) S3 present     (  ) S4 present    LUNGS:   ( x ) Unlabored respirations     (  ) tachypnea  (  ) B/L air entry     (  ) decreased breath sounds in:  (location     )    ( x ) no adventitious sound     (  ) crackles     (  ) wheezing      (  ) rhonchi      (specify location:       )  (  ) chest wall tenderness (specify location:       )    ABDOMEN: mild tenderness on palpation in b/l lower quadrants  ( x ) Soft     (  ) tense   |   (  ) nondistended     (  ) distended      ( x ) +BS     (  ) hypoactive bowel sounds     (  ) hyperactive bowel sounds  (  ) nontender     (  ) RUQ tenderness     (  ) RLQ tenderness     (  ) LLQ tenderness     (  ) epigastric tenderness     (  ) diffuse tenderness  (  ) Splenomegaly      (  ) Hepatomegaly      (  ) Jaundice     (  ) ecchymosis     EXTREMITIES:  ( x ) Normal     (  ) Rash     (  ) ecchymosis     (  ) varicose veins      (  ) pitting edema     (  ) non-pitting edema   (  ) ulceration     (  ) gangrene:     (location:     )    NERVOUS SYSTEM:    ( x ) A&Ox3     (  ) confused     (  ) lethargic  CN II-XII:     ( x ) Intact     (  ) deficits found     (Specify:     )   Upper extremities:     ( x ) no sensorimotor deficits     (  ) weakness     (  ) loss of proprioception/vibration     (  ) loss of touch/temperature (specify:    )  Lower extremities:     ( x ) no sensorimotor deficits     (  ) weakness     (  ) loss of proprioception/vibration     (  ) loss of touch/temperature (specify:    )    SKIN:   ( x ) No rashes or lesions     (  ) maculopapular rash     (  ) pustules     (  ) vesicles     (  ) ulcer     (  ) ecchymosis     (specify location:     )    ASSESSMENT:  58-year-old female past medical history of GERD, hyperlipidemia, presents with lower abdominal pain that started last night. Patient reported that 2 weeks ago she developed RLQ abdominal pain, she saw her GI Dr. Cole who prescribed her Cipro 500 and Metron 250 which she took for 10 days. Her sxs resolved but she started having loose BM after that so she contacted him again and he prescribed Bentyl which helped her bowel movements become more formed. Yesterday night she started having bilateral lower quadrant intermittent pain again. No fever, chills, nausea, vomiting.     24 HOURS EVENTS  - 10/5 : Patient is feeling well, abdominal pain improving, added lactobacillus, on a low residual diet     PLAN:  # Abdominal pain 2/2 Acute uncomplicated Sigmoid diverticulitis:  - patient is hemodynamically stable, no fever or leukocytosis , no sepsis on admission   - CT A.P with IV contrast: Colonic diverticulosis with focal region of wall edema and surrounding reactive changes in sigmoid colon suggestive of sigmoid colonic diverticulitis with no evidence of abscess  - s/p Outpatient tx with Abx for 10 days   - c/w cipro 400 mg q12 and flagyl 500 mg q8 IV  - started on 1 tab lactobacillus three times a day  - plan to discharge on PO Cipro 500 q12 and Flagyl 500 q8 for 10 more days  - zofran PRN   - liquid diet, advanced to DASH/TLC  - plan send stool studies if develops diarrhea   - follow up with primary GI as outpatient, reports last colonoscopy 2 years ago     # HDL   - c/w rosuvastatin 10 mg OD     # GERD   - c/w famotidine     Bowel  - Feeds: DASH/TLC  - Protonics: Famotidine  - Regimen: None  - Last Bowel Movement: Yesterday, well formed than before, had loose stools     Activity: Ambulating  Code status: Full  DVT prophylaxis: Enoxaparin  IV fluids: s/p NS bolus in ED  O2 support: None  Antibiotics: Cipro and Metron  Disposition: Home    TO FOLLOW UP  - Pending Urine culture and Hep C Antibody screen results

## 2023-10-05 NOTE — PROGRESS NOTE ADULT - SUBJECTIVE AND OBJECTIVE BOX
EVELIA MONTERROSO  58y  Female  ***My note supersedes ALL resident notes that I sign.  My corrections for their notes are in my note.***    I can be reached directly on [employer]30. My office number is [phone]. My personal cell number is [phone].    INTERVAL EVENTS: Here for f/u of abd pain. Pt still has LLQ pain. She is starting to eat solids. We had a long discussion re divertic dz, melchor diet and hydration. Pt thinks she might be ready to go home by melodie.    T(F): 97.6 (10-05-23 @ 05:18), Max: 99.2 (10-04-23 @ 15:53)  HR: 67 (10-05-23 @ 05:18) (67 - 92)  BP: 101/51 (10-05-23 @ 05:18) (101/51 - 128/74)  RR: 18 (10-05-23 @ 05:18) (18 - 18)  SpO2: 99% (10-04-23 @ 21:19) (99% - 100%)    Gen: NAD  HEENT: PERRL, EOMI, mouth clr, nose clr  Neck: no nodes, no JVD, thyroid nl  lungs: clr  hrt: s1 s2 rrr no murmur  abd: soft, mild LLQ distension; mild-mod LLQ pain, no rebound; no HS megaly  ext: no edema, no c/c  neuro: aa ox3, cn intact, can move all 4 ext    LABS:                      14.5    (    90.1   9.48  )-----------( ---------      296      ( 04 Oct 2023 11:30 )             41.9    (    10.8     WBC Count: 9.48 K/uL (10-04-23 @ 11:30)    Urinalysis Basic - ( 04 Oct 2023 11:30 )    Color: Yellow / Appearance: Clear / S.007 / pH: x  Gluc: 117 mg/dL / Ketone: Negative mg/dL  / Bili: Negative / Urobili: 0.2 mg/dL   Blood: x / Protein: Negative mg/dL / Nitrite: Negative   Leuk Esterase: Trace / RBC: 1 /HPF / WBC 1 /HPF   Sq Epi: x / Non Sq Epi: x / Bacteria: x    RADIOLOGY & ADDITIONAL TESTS:  < from: CT Abdomen and Pelvis w/ IV Cont (10.04.23 @ 11:14) >  PERITONEUM/MESENTERY/BOWEL: No small bowel obstruction, ascites or free   intraperitoneal air. Normal appendix. Colonic diverticulosis with a focal   region of wall edema and surrounding reactive changes in the sigmoid   colon. No evidence of abscess.    BONES/SOFT TISSUES: Degenerative changes of the osseous structures.    IMPRESSION:    Sigmoid colonic diverticulitis.    < end of copied text >    MEDICATIONS:  ciprofloxacin   IVPB 400 milliGRAM(s) IV Intermittent every 12 hours  metroNIDAZOLE  IVPB 500 milliGRAM(s) IV Intermittent every 8 hours    acetaminophen     Tablet .. 650 milliGRAM(s) Oral every 6 hours PRN  atorvastatin 80 milliGRAM(s) Oral at bedtime  chlorhexidine 2% Cloths 1 Application(s) Topical <User Schedule>  enoxaparin Injectable 40 milliGRAM(s) SubCutaneous every 24 hours  influenza   Vaccine 0.5 milliLiter(s) IntraMuscular once  ondansetron  IVPB 4 milliGRAM(s) IV Intermittent every 8 hours PRN

## 2023-10-05 NOTE — PROGRESS NOTE ADULT - ASSESSMENT
58-year-old woman past medical history of GERD, hyperlipidemia, presents with lower abdominal pain that started last night. Patient reported that 2 weeks ago she developed RLQ abdominal pain, she saw her GI Dr. Cole who prescribed her cipro 500 q12 and flagyl 250 q8, which she took for 10 days. Her sxs resolved, but she started having loose BM after that so she contacted him again and he prescribed Bentyl, which helped her bowel movements become more formed. Yesterday night she started having bilateral lower quadrant intermittent pain again. No fever, chills, nausea, vomiting.     # Abdominal pain 2/2 Acute uncomplicated Sigmoid diverticulitis:   pt had 1 episode 4/2021 (sent home from ED on Cipro/Flagyl); then these 2 episodes back-to-back; only hospitalized this 1 time; no microperf or abscess  Sx not indicated  diet: low residual til better  IVFs: not needed  abx: cipro 400mg iv q12 + flagyl 500mg iv q8 (upon d/c, cipro 500 po q12 + flagyl 500 po q8 to complete another 10-day course)  CT A/P with IV contrast: Sigmoid colonic diverticulitis (mild)  pain control: toradol 15mg iv q6 prn x2 days  zofran PRN N/V  send stool studies if develops diarrhea again  f/u w/ GI (Dr Cole) as outpt    # HDL   c/w rosuvastatin 10 mg OD (lip 80 qhs)    # GERD   c/w famotidine 20mg po q12    # DVT ppx: lovenox     # GI ppx: famotidine     # Activity: as tolerated     # updated  at bedside    Dispo: iv abx; tx pain;   eventually, pt will go home w/ oral abx and no needs - f/u CM - anticipate d/c melodie 58-year-old woman past medical history of GERD, hyperlipidemia, presents with lower abdominal pain that started last night. Patient reported that 2 weeks ago she developed RLQ abdominal pain, she saw her GI Dr. Cole who prescribed her cipro 500 q12 and flagyl 250 q8, which she took for 10 days. Her sxs resolved, but she started having loose BM after that so she contacted him again and he prescribed Bentyl, which helped her bowel movements become more formed. Yesterday night she started having bilateral lower quadrant intermittent pain again. No fever, chills, nausea, vomiting.     # Abdominal pain 2/2 Acute uncomplicated Sigmoid diverticulitis:   pt had 1 episode 4/2021 (sent home from ED on Cipro/Flagyl); then these 2 episodes back-to-back; only hospitalized this 1 time; no microperf or abscess  Sx not indicated  diet: low residual til better  IVFs: not needed  abx: cipro 400mg iv q12 + flagyl 500mg iv q8 (upon d/c, cipro 500 po q12 + flagyl 500 po q8 to complete another 10-day course)  add lactobacillus 1 po 3x/ w/ meals  CT A/P with IV contrast: Sigmoid colonic diverticulitis (mild)  pain control: toradol 15mg iv q6 prn x2 days  zofran PRN N/V  send stool studies if develops diarrhea again  f/u w/ GI (Dr Cole) as outpt    # HDL   c/w rosuvastatin 10 mg OD (lip 80 qhs)    # GERD   c/w famotidine 20mg po q12    # DVT ppx: lovenox     # GI ppx: famotidine     # Activity: as tolerated     # updated  at bedside    Dispo: iv abx; tx pain; probiotic  eventually, pt will go home w/ oral abx and no needs - f/u CM - anticipate d/c melodie

## 2023-10-05 NOTE — PROGRESS NOTE ADULT - SUBJECTIVE AND OBJECTIVE BOX
24H events:    Patient is a 58y old Female who presents with a chief complaint of Abdominal pain (04 Oct 2023 14:55)    Primary diagnosis of Sigmoid diverticulitis        PAST MEDICAL & SURGICAL HISTORY  High cholesterol    No significant past surgical history      SOCIAL HISTORY:  Social History:      ALLERGIES:  No Known Allergies      VITALS:   T(F): 97.6  HR: 67  BP: 101/51  RR: 18  SpO2: 99%    LABS:                        14.5   9.48  )-----------( 296      ( 04 Oct 2023 11:30 )             41.9     10    139  |  103  |  9<L>  ----------------------------<  117<H>  4.0   |  24  |  0.7    Ca    9.8      04 Oct 2023 11:30    TPro  7.2  /  Alb  5.1  /  TBili  1.3<H>  /  DBili  x   /  AST  23  /  ALT  19  /  AlkPhos  102  10-04      Urinalysis Basic - ( 04 Oct 2023 11:30 )    Color: Yellow / Appearance: Clear / S.007 / pH: x  Gluc: 117 mg/dL / Ketone: Negative mg/dL  / Bili: Negative / Urobili: 0.2 mg/dL   Blood: x / Protein: Negative mg/dL / Nitrite: Negative   Leuk Esterase: Trace / RBC: 1 /HPF / WBC 1 /HPF   Sq Epi: x / Non Sq Epi: x / Bacteria: x        Lactate, Blood: 1.6 mmol/L (10-04-23 @ 11:30)              HOME MEDICATIONS:  famotidine 40 mg oral tablet: 1 tab(s) orally once a day  rosuvastatin 10 mg oral tablet: 1 tab(s) orally once a day      MEDICATIONS:  STANDING MEDICATIONS  atorvastatin 80 milliGRAM(s) Oral at bedtime  chlorhexidine 2% Cloths 1 Application(s) Topical <User Schedule>  ciprofloxacin   IVPB 400 milliGRAM(s) IV Intermittent every 12 hours  enoxaparin Injectable 40 milliGRAM(s) SubCutaneous every 24 hours  influenza   Vaccine 0.5 milliLiter(s) IntraMuscular once  metroNIDAZOLE  IVPB 500 milliGRAM(s) IV Intermittent every 8 hours    PRN MEDICATIONS  acetaminophen     Tablet .. 650 milliGRAM(s) Oral every 6 hours PRN  ondansetron  IVPB 4 milliGRAM(s) IV Intermittent every 8 hours PRN

## 2023-10-06 ENCOUNTER — TRANSCRIPTION ENCOUNTER (OUTPATIENT)
Age: 58
End: 2023-10-06

## 2023-10-06 VITALS
TEMPERATURE: 98 F | DIASTOLIC BLOOD PRESSURE: 54 MMHG | SYSTOLIC BLOOD PRESSURE: 101 MMHG | RESPIRATION RATE: 18 BRPM | HEART RATE: 56 BPM

## 2023-10-06 LAB
ALBUMIN SERPL ELPH-MCNC: 3.7 G/DL — SIGNIFICANT CHANGE UP (ref 3.5–5.2)
ALP SERPL-CCNC: 71 U/L — SIGNIFICANT CHANGE UP (ref 30–115)
ALT FLD-CCNC: 13 U/L — SIGNIFICANT CHANGE UP (ref 0–41)
ANION GAP SERPL CALC-SCNC: 9 MMOL/L — SIGNIFICANT CHANGE UP (ref 7–14)
AST SERPL-CCNC: 18 U/L — SIGNIFICANT CHANGE UP (ref 0–41)
BASOPHILS # BLD AUTO: 0.04 K/UL — SIGNIFICANT CHANGE UP (ref 0–0.2)
BASOPHILS NFR BLD AUTO: 1.1 % — HIGH (ref 0–1)
BILIRUB SERPL-MCNC: 0.8 MG/DL — SIGNIFICANT CHANGE UP (ref 0.2–1.2)
BUN SERPL-MCNC: 10 MG/DL — SIGNIFICANT CHANGE UP (ref 10–20)
CALCIUM SERPL-MCNC: 9 MG/DL — SIGNIFICANT CHANGE UP (ref 8.4–10.4)
CHLORIDE SERPL-SCNC: 108 MMOL/L — SIGNIFICANT CHANGE UP (ref 98–110)
CO2 SERPL-SCNC: 26 MMOL/L — SIGNIFICANT CHANGE UP (ref 17–32)
CREAT SERPL-MCNC: 0.7 MG/DL — SIGNIFICANT CHANGE UP (ref 0.7–1.5)
EGFR: 100 ML/MIN/1.73M2 — SIGNIFICANT CHANGE UP
EOSINOPHIL # BLD AUTO: 0.15 K/UL — SIGNIFICANT CHANGE UP (ref 0–0.7)
EOSINOPHIL NFR BLD AUTO: 4 % — SIGNIFICANT CHANGE UP (ref 0–8)
GLUCOSE SERPL-MCNC: 125 MG/DL — HIGH (ref 70–99)
HCT VFR BLD CALC: 34.8 % — LOW (ref 37–47)
HGB BLD-MCNC: 11.9 G/DL — LOW (ref 12–16)
IMM GRANULOCYTES NFR BLD AUTO: 0.3 % — SIGNIFICANT CHANGE UP (ref 0.1–0.3)
LYMPHOCYTES # BLD AUTO: 1.61 K/UL — SIGNIFICANT CHANGE UP (ref 1.2–3.4)
LYMPHOCYTES # BLD AUTO: 42.9 % — SIGNIFICANT CHANGE UP (ref 20.5–51.1)
MAGNESIUM SERPL-MCNC: 2 MG/DL — SIGNIFICANT CHANGE UP (ref 1.8–2.4)
MCHC RBC-ENTMCNC: 31.2 PG — HIGH (ref 27–31)
MCHC RBC-ENTMCNC: 34.2 G/DL — SIGNIFICANT CHANGE UP (ref 32–37)
MCV RBC AUTO: 91.1 FL — SIGNIFICANT CHANGE UP (ref 81–99)
MONOCYTES # BLD AUTO: 0.29 K/UL — SIGNIFICANT CHANGE UP (ref 0.1–0.6)
MONOCYTES NFR BLD AUTO: 7.7 % — SIGNIFICANT CHANGE UP (ref 1.7–9.3)
NEUTROPHILS # BLD AUTO: 1.65 K/UL — SIGNIFICANT CHANGE UP (ref 1.4–6.5)
NEUTROPHILS NFR BLD AUTO: 44 % — SIGNIFICANT CHANGE UP (ref 42.2–75.2)
NRBC # BLD: 0 /100 WBCS — SIGNIFICANT CHANGE UP (ref 0–0)
PLATELET # BLD AUTO: 213 K/UL — SIGNIFICANT CHANGE UP (ref 130–400)
PMV BLD: 9.5 FL — SIGNIFICANT CHANGE UP (ref 7.4–10.4)
POTASSIUM SERPL-MCNC: 4.1 MMOL/L — SIGNIFICANT CHANGE UP (ref 3.5–5)
POTASSIUM SERPL-SCNC: 4.1 MMOL/L — SIGNIFICANT CHANGE UP (ref 3.5–5)
PROT SERPL-MCNC: 5.6 G/DL — LOW (ref 6–8)
RBC # BLD: 3.82 M/UL — LOW (ref 4.2–5.4)
RBC # FLD: 10.8 % — LOW (ref 11.5–14.5)
SODIUM SERPL-SCNC: 143 MMOL/L — SIGNIFICANT CHANGE UP (ref 135–146)
WBC # BLD: 3.75 K/UL — LOW (ref 4.8–10.8)
WBC # FLD AUTO: 3.75 K/UL — LOW (ref 4.8–10.8)

## 2023-10-06 PROCEDURE — 99239 HOSP IP/OBS DSCHRG MGMT >30: CPT

## 2023-10-06 RX ORDER — LACTOBACILLUS ACIDOPHILUS 100MM CELL
1 CAPSULE ORAL
Qty: 30 | Refills: 0
Start: 2023-10-06 | End: 2023-10-15

## 2023-10-06 RX ORDER — LACTOBACILLUS ACIDOPHILUS 100MM CELL
1 CAPSULE ORAL
Qty: 60 | Refills: 0
Start: 2023-10-06 | End: 2023-10-25

## 2023-10-06 RX ORDER — METRONIDAZOLE 500 MG
1 TABLET ORAL
Qty: 24 | Refills: 0
Start: 2023-10-06 | End: 2023-10-13

## 2023-10-06 RX ORDER — CIPROFLOXACIN LACTATE 400MG/40ML
1 VIAL (ML) INTRAVENOUS
Qty: 16 | Refills: 0
Start: 2023-10-06 | End: 2023-10-13

## 2023-10-06 RX ORDER — METRONIDAZOLE 500 MG
1 TABLET ORAL
Qty: 30 | Refills: 0
Start: 2023-10-06 | End: 2023-10-15

## 2023-10-06 RX ORDER — LACTOBACILLUS ACIDOPHILUS 100MM CELL
1 CAPSULE ORAL
Qty: 90 | Refills: 0
Start: 2023-10-06 | End: 2023-11-04

## 2023-10-06 RX ORDER — CIPROFLOXACIN LACTATE 400MG/40ML
1 VIAL (ML) INTRAVENOUS
Qty: 20 | Refills: 0
Start: 2023-10-06 | End: 2023-10-15

## 2023-10-06 RX ADMIN — Medication 1 TABLET(S): at 07:53

## 2023-10-06 RX ADMIN — Medication 200 MILLIGRAM(S): at 06:35

## 2023-10-06 RX ADMIN — FAMOTIDINE 20 MILLIGRAM(S): 10 INJECTION INTRAVENOUS at 06:37

## 2023-10-06 RX ADMIN — Medication 100 MILLIGRAM(S): at 06:35

## 2023-10-06 NOTE — DISCHARGE NOTE PROVIDER - NSDCMRMEDTOKEN_GEN_ALL_CORE_FT
Cipro 500 mg oral tablet: 1 tab(s) orally 2 times a day End Date: 10/15  famotidine 40 mg oral tablet: 1 tab(s) orally once a day  lactobacillus acidophilus oral capsule: 1 cap(s) orally 3 times a day End Date: 10/15  metroNIDAZOLE 500 mg oral tablet: 1 tab(s) orally 3 times a day End Date: 10/15  rosuvastatin 10 mg oral tablet: 1 tab(s) orally once a day   Cipro 500 mg oral tablet: 1 tab(s) orally 2 times a day End Date: 10/15  famotidine 40 mg oral tablet: 1 tab(s) orally once a day  lactobacillus acidophilus oral capsule: 1 cap(s) orally 3 times a day End Date: 10/15  rosuvastatin 10 mg oral tablet: 1 tab(s) orally once a day   Cipro 500 mg oral tablet: 1 tab(s) orally 2 times a day End Date: 10/13  famotidine 40 mg oral tablet: 1 tab(s) orally once a day  lactobacillus acidophilus oral capsule: 1 cap(s) orally 3 times a day  metroNIDAZOLE 500 mg oral tablet: 1 tab(s) orally 3 times a day End Date: 10/13  rosuvastatin 10 mg oral tablet: 1 tab(s) orally once a day

## 2023-10-06 NOTE — DISCHARGE NOTE PROVIDER - CARE PROVIDER_API CALL
Migue Cole  Gastroenterology  [address] [place] [address]  Phone: [phone]  Fax: [phone]  Follow Up Time: 1 week   Migue Cole  Gastroenterology  [address] [place] [address]  Phone: [phone]  Fax: [phone]  Follow Up Time: 1 week    Sarai Min  Internal Medicine  [address] [place] [address]  Phone: [phone]  Fax: [phone]  Follow Up Time: 2 weeks

## 2023-10-06 NOTE — DISCHARGE NOTE NURSING/CASE MANAGEMENT/SOCIAL WORK - NSDCPEFALRISK_GEN_ALL_CORE
For information on Fall & Injury Prevention, visit: [email or web address] OR  [email or web address] OR  https://www.cdc.gov/steadi/patient.html

## 2023-10-06 NOTE — DISCHARGE NOTE PROVIDER - NSDCCPCAREPLAN_GEN_ALL_CORE_FT
PRINCIPAL DISCHARGE DIAGNOSIS  Diagnosis: Sigmoid diverticulitis  Assessment and Plan of Treatment: You presented with complaints of lower abdominal pain. A CT scan of abdomen was done which showed acute sigmoid diverticulitis. Sigmoid diverticulitis is a inflammation of few outpouchings in you colon. You were treated with IV antibiotics. You were tolerating the diet well and vitally and functionally stable so we are discharging you with a 10 day course of oral antibiotics Cipro and Flagyll and a probiotic. Please complete the course and follow up with gastroenterology Dr Cole. You will also need a colonoscopy six to eight weeks after you are symptom free unless performed within last year.

## 2023-10-06 NOTE — PROGRESS NOTE ADULT - SUBJECTIVE AND OBJECTIVE BOX
EVELIA MONTERROSO  58y  Female  ***My note supersedes ALL resident notes that I sign.  My corrections for their notes are in my note.***    I can be reached directly on [email or web address]. My office number is [phone]. My personal cell number is [phone].    INTERVAL EVENTS: Here for f/u of divertic. Pain is much better (but not entirely gone). Walks great. Shahriar diet. Pt feels OK to go home.    T(F): 97.5 (10-06-23 @ 05:31), Max: 97.6 (10-05-23 @ 20:05)  HR: 56 (10-06-23 @ 05:31) (56 - 98)  BP: 101/54 (10-06-23 @ 05:31) (101/54 - 114/61)  RR: 18 (10-06-23 @ 05:31) (18 - 18)  SpO2: --    Gen: NAD  HEENT: PERRL, EOMI, mouth clr, nose clr  Neck: no nodes, no JVD, thyroid nl  lungs: clr  hrt: s1 s2 rrr no murmur  abd: soft, mild LLQ distension; mild-mod LLQ pain, no rebound; no HS megaly  ext: no edema, no c/c  neuro: aa ox3, cn intact, can move all 4 ext    LABS:                      11.9    (    91.1   3.75  )-----------( ---------      213      ( 06 Oct 2023 07:21 )             34.8    (    10.8     143   (   108   (   125      10-06-23 @ 07:21  ----------------------               4.1   (   26   (   10                             -----                        0.7  Ca  9.0   Mg  2.0    P   --     LFT  5.6  (  0.8  (  18       10-06-23 @ 07:21  -------------------------  3.7  (  71  (  13    Urinalysis Basic - ( 06 Oct 2023 07:21 )    Color: x / Appearance: x / SG: x / pH: x  Gluc: 125 mg/dL / Ketone: x  / Bili: x / Urobili: x   Blood: x / Protein: x / Nitrite: x   Leuk Esterase: x / RBC: x / WBC x   Sq Epi: x / Non Sq Epi: x / Bacteria: x    Culture - Urine (collected 10-04-23 @ 11:30)  Source: Clean Catch Clean Catch (Midstream)  Preliminary Report (10-06-23 @ 13:47):    10,000 - 49,000 CFU/mL Streptococcus gallolyticus    <10,000 CFU/ml Normal Urogenital monica present    RADIOLOGY & ADDITIONAL TESTS:    MEDICATIONS:  ciprofloxacin   IVPB 400 milliGRAM(s) IV Intermittent every 12 hours  metroNIDAZOLE  IVPB 500 milliGRAM(s) IV Intermittent every 8 hours    acetaminophen     Tablet .. 650 milliGRAM(s) Oral every 6 hours PRN  atorvastatin 80 milliGRAM(s) Oral at bedtime  chlorhexidine 2% Cloths 1 Application(s) Topical <User Schedule>  enoxaparin Injectable 40 milliGRAM(s) SubCutaneous every 24 hours  famotidine    Tablet 20 milliGRAM(s) Oral two times a day  influenza   Vaccine 0.5 milliLiter(s) IntraMuscular once  ketorolac   Injectable 15 milliGRAM(s) IV Push every 6 hours PRN  lactobacillus acidophilus 1 Tablet(s) Oral three times a day with meals  ondansetron  IVPB 4 milliGRAM(s) IV Intermittent every 8 hours PRN     EVELIA MONTERROSO  58y  Female  ***My note supersedes ALL resident notes that I sign.  My corrections for their notes are in my note.***    I can be reached directly on [employer]. My office number is [phone]. My personal cell number is [phone].    INTERVAL EVENTS: Here for f/u of divertic. Pain is much better (but not entirely gone). Walks great. Shahriar diet. Pt feels OK to go home. Has some mild bloating from gas.    T(F): 97.5 (10-06-23 @ 05:31), Max: 97.6 (10-05-23 @ 20:05)  HR: 56 (10-06-23 @ 05:31) (56 - 98)  BP: 101/54 (10-06-23 @ 05:31) (101/54 - 114/61)  RR: 18 (10-06-23 @ 05:31) (18 - 18)  SpO2: --    Gen: NAD  HEENT: PERRL, EOMI, mouth clr, nose clr  Neck: no nodes, no JVD, thyroid nl  lungs: clr  hrt: s1 s2 rrr no murmur  abd: soft, mild LLQ distension; mild-mod LLQ pain, no rebound; no HS megaly  ext: no edema, no c/c  neuro: aa ox3, cn intact, can move all 4 ext    LABS:                      11.9    (    91.1   3.75  )-----------( ---------      213      ( 06 Oct 2023 07:21 )             34.8    (    10.8     143   (   108   (   125      10-06-23 @ 07:21  ----------------------               4.1   (   26   (   10                             -----                        0.7  Ca  9.0   Mg  2.0    P   --     LFT  5.6  (  0.8  (  18       10-06-23 @ 07:21  -------------------------  3.7  (  71  (  13    Urinalysis Basic - ( 06 Oct 2023 07:21 )    Color: x / Appearance: x / SG: x / pH: x  Gluc: 125 mg/dL / Ketone: x  / Bili: x / Urobili: x   Blood: x / Protein: x / Nitrite: x   Leuk Esterase: x / RBC: x / WBC x   Sq Epi: x / Non Sq Epi: x / Bacteria: x    Culture - Urine (collected 10-04-23 @ 11:30)  Source: Clean Catch Clean Catch (Midstream)  Preliminary Report (10-06-23 @ 13:47):    10,000 - 49,000 CFU/mL Streptococcus gallolyticus    <10,000 CFU/ml Normal Urogenital monica present    RADIOLOGY & ADDITIONAL TESTS:    MEDICATIONS:  ciprofloxacin   IVPB 400 milliGRAM(s) IV Intermittent every 12 hours  metroNIDAZOLE  IVPB 500 milliGRAM(s) IV Intermittent every 8 hours    acetaminophen     Tablet .. 650 milliGRAM(s) Oral every 6 hours PRN  atorvastatin 80 milliGRAM(s) Oral at bedtime  chlorhexidine 2% Cloths 1 Application(s) Topical <User Schedule>  enoxaparin Injectable 40 milliGRAM(s) SubCutaneous every 24 hours  famotidine    Tablet 20 milliGRAM(s) Oral two times a day  influenza   Vaccine 0.5 milliLiter(s) IntraMuscular once  ketorolac   Injectable 15 milliGRAM(s) IV Push every 6 hours PRN  lactobacillus acidophilus 1 Tablet(s) Oral three times a day with meals  ondansetron  IVPB 4 milliGRAM(s) IV Intermittent every 8 hours PRN

## 2023-10-06 NOTE — PROGRESS NOTE ADULT - ASSESSMENT
58-year-old woman past medical history of GERD, hyperlipidemia, presents with lower abdominal pain that started last night. Patient reported that 2 weeks ago she developed RLQ abdominal pain, she saw her GI Dr. Cole who prescribed her cipro 500 q12 and flagyl 250 q8, which she took for 10 days. Her sxs resolved, but she started having loose BM after that so she contacted him again and he prescribed Bentyl, which helped her bowel movements become more formed. Yesterday night she started having bilateral lower quadrant intermittent pain again. No fever, chills, nausea, vomiting.     # Abdominal pain 2/2 acute uncomplicated sigmoid diverticulitis:   pt had 1 episode 4/2021 (sent home from ED on Cipro/Flagyl); then these 2 episodes back-to-back; only hospitalized this 1 time; no microperf or abscess  Sx not indicated  diet: low residual til better  IVFs: not needed  abx: cipro 400mg iv q12 + flagyl 500mg iv q8 (upon d/c, cipro 500 po q12 + flagyl 500 po q8 to complete another 10-day course)  add lactobacillus 1 po 3x/ w/ meals - use while on abx, then can stop  CT A/P with IV contrast: Sigmoid colonic diverticulitis (mild)  pain control: toradol 15mg iv q6 prn x2 days - can use motrin or tylenol at home  zofran PRN N/V - not needed at home  f/u w/ GI (Dr Cole) as outpt    # HDL   c/w rosuvastatin 10 mg OD (lip 80 qhs)    # GERD   c/w famotidine 20mg po q12    # DVT ppx: lovenox     # GI ppx: famotidine     # Activity: as tolerated     Dispo: abx; tx pain; probiotic  today, pt will go home w/ oral abx and no needs - f/u CM - stable for d/c 58-year-old woman past medical history of GERD, hyperlipidemia, presents with lower abdominal pain that started last night. Patient reported that 2 weeks ago she developed RLQ abdominal pain, she saw her GI Dr. Cole who prescribed her cipro 500 q12 and flagyl 250 q8, which she took for 10 days. Her sxs resolved, but she started having loose BM after that so she contacted him again and he prescribed Bentyl, which helped her bowel movements become more formed. Yesterday night she started having bilateral lower quadrant intermittent pain again. No fever, chills, nausea, vomiting.     # Abdominal pain 2/2 acute uncomplicated sigmoid diverticulitis:   pt had 1 episode 4/2021 (sent home from ED on Cipro/Flagyl); then these 2 episodes back-to-back; only hospitalized this 1 time; no microperf or abscess  Sx not indicated  diet: low residual til better  IVFs: not needed  abx: cipro 400mg iv q12 + flagyl 500mg iv q8 (upon d/c, cipro 500 po q12 + flagyl 500 po q8 to complete another 10-day course)  add lactobacillus 1 po 3x/ w/ meals - use while on abx, then can stop  CT A/P with IV contrast: Sigmoid colonic diverticulitis (mild)  pain control: toradol 15mg iv q6 prn x2 days - can use motrin or tylenol at home  zofran PRN N/V - not needed at home  Gas-x (simethicone) 80mg po q6 til better (OTC)  f/u w/ GI (Dr Cole) as outpt    # HDL   c/w rosuvastatin 10 mg OD (lip 80 qhs)    # GERD   c/w famotidine 20mg po q12    # DVT ppx: lovenox     # GI ppx: famotidine     # Activity: as tolerated     Dispo: abx; tx pain; probiotic  today, pt will go home w/ oral abx and no needs - f/u CM - stable for d/c

## 2023-10-06 NOTE — DISCHARGE NOTE PROVIDER - HOSPITAL COURSE
58-year-old female past medical history of GERD, hyperlipidemia, presents with lower abdominal pain that started last night. Patient reported that 2 weeks ago she developed RLQ abdominal pain, she saw her GI Dr. Cole who prescribed her 2 antibiotics ( does not recall name ) which she took for 10 days. Her sxs resolved but she started having loose BM after that so she contacted him again and he prescribed Bentyl which helped her bowel movements become more formed. Yesterday night she started having bilateral lower quadrant intermittent pain again. No fever, chills, nausea, vomiting. Last colonoscopy done < 2 yrs ago.     In ED vitals: T 99.1, , /83, spo2 100% on RA   Labs: unremarkable   CT A.P with IV contrast: Sigmoid colonic diverticulitis without microperforation or abscess.   s/p cipro and flagyl in ED     She was admitted for further management.     Ciprofloxacin 400 mg IV q12 and Metronidazole 500 IV q8 was continued. Her diet was advanced as she tolerated and she was kept in a low residue diet. Her pain was adequately controlled. She had no nausea/vomiting. She was vitally stable and had regular bowel movements. She has mild lower abdominal tenderness and feels well and is ready to be discharged. 58-year-old female past medical history of GERD, hyperlipidemia, presents with lower abdominal pain that started last night. Patient reported that 2 weeks ago she developed RLQ abdominal pain, she saw her GI Dr. Cole who prescribed her 2 antibiotics ( does not recall name ) which she took for 10 days. Her sxs resolved but she started having loose BM after that so she contacted him again and he prescribed Bentyl which helped her bowel movements become more formed. Yesterday night she started having bilateral lower quadrant intermittent pain again. No fever, chills, nausea, vomiting. Last colonoscopy done < 2 yrs ago.     In ED vitals: T 99.1, , /83, spo2 100% on RA   Labs: unremarkable   CT A.P with IV contrast: Sigmoid colonic diverticulitis without microperforation or abscess.   s/p cipro and flagyl in ED     She was admitted for further management.     Ciprofloxacin 400 mg IV q12 and Metronidazole 500 IV q8 was continued. Her diet was advanced as she tolerated and she was kept in a low residue diet. Her pain was adequately controlled. She had no nausea/vomiting.     She is vitally stable is having regular bowel movements. She has mild lower abdominal tenderness and feels well today and is ready to be discharged.

## 2023-10-06 NOTE — DISCHARGE NOTE NURSING/CASE MANAGEMENT/SOCIAL WORK - PATIENT PORTAL LINK FT
You can access the FollowMyHealth Patient Portal offered by [facility] by registering at the following website: [email or web address]. By joining [facility]’s FollowMyHealth portal, you will also be able to view your health information using other applications (apps) compatible with our system.

## 2023-10-06 NOTE — DISCHARGE NOTE PROVIDER - PROVIDER TOKENS
PROVIDER:[TOKEN:[[id number]],FOLLOWUP:[1 week]] PROVIDER:[TOKEN:[[id number]],FOLLOWUP:[1 week]],PROVIDER:[TOKEN:[[id number]],FOLLOWUP:[2 weeks]]

## 2023-10-07 LAB
-  CEFTRIAXONE: SIGNIFICANT CHANGE UP
-  LEVOFLOXACIN: SIGNIFICANT CHANGE UP
-  PENICILLIN: SIGNIFICANT CHANGE UP
-  VANCOMYCIN: SIGNIFICANT CHANGE UP
CULTURE RESULTS: SIGNIFICANT CHANGE UP
METHOD TYPE: SIGNIFICANT CHANGE UP
ORGANISM # SPEC MICROSCOPIC CNT: SIGNIFICANT CHANGE UP
ORGANISM # SPEC MICROSCOPIC CNT: SIGNIFICANT CHANGE UP
SPECIMEN SOURCE: SIGNIFICANT CHANGE UP

## 2023-10-10 DIAGNOSIS — K57.32 DIVERTICULITIS OF LARGE INTESTINE WITHOUT PERFORATION OR ABSCESS WITHOUT BLEEDING: ICD-10-CM

## 2023-10-10 DIAGNOSIS — K21.9 GASTRO-ESOPHAGEAL REFLUX DISEASE WITHOUT ESOPHAGITIS: ICD-10-CM

## 2023-10-10 DIAGNOSIS — E78.5 HYPERLIPIDEMIA, UNSPECIFIED: ICD-10-CM

## 2023-12-06 ENCOUNTER — OUTPATIENT (OUTPATIENT)
Dept: OUTPATIENT SERVICES | Facility: HOSPITAL | Age: 58
LOS: 1 days | End: 2023-12-06
Payer: COMMERCIAL

## 2023-12-06 DIAGNOSIS — Z12.31 ENCOUNTER FOR SCREENING MAMMOGRAM FOR MALIGNANT NEOPLASM OF BREAST: ICD-10-CM

## 2023-12-06 PROCEDURE — 77063 BREAST TOMOSYNTHESIS BI: CPT

## 2023-12-06 PROCEDURE — 77067 SCR MAMMO BI INCL CAD: CPT

## 2023-12-06 PROCEDURE — 77063 BREAST TOMOSYNTHESIS BI: CPT | Mod: 26

## 2023-12-06 PROCEDURE — 77067 SCR MAMMO BI INCL CAD: CPT | Mod: 26

## 2023-12-07 DIAGNOSIS — Z12.31 ENCOUNTER FOR SCREENING MAMMOGRAM FOR MALIGNANT NEOPLASM OF BREAST: ICD-10-CM

## 2024-02-12 PROBLEM — Z00.00 ENCOUNTER FOR PREVENTIVE HEALTH EXAMINATION: Status: ACTIVE | Noted: 2024-02-12

## 2024-02-14 ENCOUNTER — APPOINTMENT (OUTPATIENT)
Dept: SURGERY | Facility: CLINIC | Age: 59
End: 2024-02-14

## 2024-02-27 ENCOUNTER — APPOINTMENT (OUTPATIENT)
Dept: SURGERY | Facility: CLINIC | Age: 59
End: 2024-02-27
Payer: COMMERCIAL

## 2024-02-27 VITALS
BODY MASS INDEX: 22.18 KG/M2 | WEIGHT: 110 LBS | HEART RATE: 100 BPM | SYSTOLIC BLOOD PRESSURE: 144 MMHG | DIASTOLIC BLOOD PRESSURE: 98 MMHG | HEIGHT: 59 IN | OXYGEN SATURATION: 97 % | TEMPERATURE: 97 F

## 2024-02-27 DIAGNOSIS — Z86.39 PERSONAL HISTORY OF OTHER ENDOCRINE, NUTRITIONAL AND METABOLIC DISEASE: ICD-10-CM

## 2024-02-27 DIAGNOSIS — E78.00 PURE HYPERCHOLESTEROLEMIA, UNSPECIFIED: ICD-10-CM

## 2024-02-27 DIAGNOSIS — K57.32 DIVERTICULITIS OF LARGE INTESTINE W/OUT PERFORATION OR ABSCESS W/OUT BLEEDING: ICD-10-CM

## 2024-02-27 DIAGNOSIS — Z78.9 OTHER SPECIFIED HEALTH STATUS: ICD-10-CM

## 2024-02-27 DIAGNOSIS — Z87.19 PERSONAL HISTORY OF OTHER DISEASES OF THE DIGESTIVE SYSTEM: ICD-10-CM

## 2024-02-27 PROCEDURE — 99203 OFFICE O/P NEW LOW 30 MIN: CPT

## 2024-03-01 ENCOUNTER — NON-APPOINTMENT (OUTPATIENT)
Age: 59
End: 2024-03-01

## 2024-03-01 RX ORDER — METRONIDAZOLE 500 MG/1
500 TABLET ORAL 3 TIMES DAILY
Qty: 30 | Refills: 0 | Status: ACTIVE | COMMUNITY
Start: 2024-03-01 | End: 1900-01-01

## 2024-03-01 RX ORDER — CIPROFLOXACIN HYDROCHLORIDE 500 MG/1
500 TABLET, FILM COATED ORAL
Qty: 20 | Refills: 0 | Status: ACTIVE | COMMUNITY
Start: 2024-03-01 | End: 1900-01-01

## 2024-03-11 PROBLEM — K57.32 DIVERTICULITIS LARGE INTESTINE: Status: ACTIVE | Noted: 2024-03-01

## 2024-03-11 PROBLEM — Z86.39 HISTORY OF HYPERLIPIDEMIA: Status: RESOLVED | Noted: 2024-03-04 | Resolved: 2024-03-11

## 2024-03-11 PROBLEM — Z78.9 SOCIAL ALCOHOL USE: Status: ACTIVE | Noted: 2024-02-27

## 2024-03-11 PROBLEM — E78.00 HYPERCHOLESTEROLEMIA: Status: ACTIVE | Noted: 2024-02-27

## 2024-03-11 PROBLEM — Z87.19 HISTORY OF DIVERTICULITIS: Status: ACTIVE | Noted: 2024-02-27

## 2024-03-11 RX ORDER — ROSUVASTATIN CALCIUM 5 MG/1
TABLET, FILM COATED ORAL
Refills: 0 | Status: ACTIVE | COMMUNITY

## 2024-03-11 NOTE — HISTORY OF PRESENT ILLNESS
[FreeTextEntry1] : Patient is a 58-year-old female with a PMHx of hyperlipidemia, and PSHx of  who presents for evaluation of diverticulitis. Patient reports her first episode of diverticulitis in 2021. She then had another episode in 2022 and 2023. Her most recent episode in October required admission and IV antibiotics. She now states she has persistent left-lower quadrant pain. She otherwise feels well. She is tolerating a diet and denies recent fevers, chills, nausea, or vomiting. She denies a family history of colon cancer, rectal cancer, or inflammatory bowel disease. Her last colonoscopy was in 2023 with Dr. Cole, which showed severe diverticulosis of the sigmoid colon.

## 2024-03-11 NOTE — ADDENDUM
[FreeTextEntry1] : I, Ludivina Gray ([facility]) assisted in filling out this chart under the dictation of Dr. Kyree Hahn on 03/01/2024.

## 2024-03-11 NOTE — ASSESSMENT
[FreeTextEntry1] : 58-year-old female with recurrent sigmoid uncomplicated diverticulitis.  I spoke to the patient regarding her diagnosis. We discussed that with her recurrent episodes and persistent left-lower quadrant pain after treatment, I would recommend robot-assisted laparoscopic sigmoidectomy. Patient is unsure if she'd like to proceed with surgery at the time. She would prefer to observe and reevaluate. If she has additional episodes, she'll contact our office with recurrent symptoms. We'll see her back as needed.

## 2024-07-13 NOTE — ED ADULT NURSE NOTE - NSICDXNOPASTSURGICALHX_GEN_ALL_CORE
<-- Click to add NO significant Past Surgical History Patient answered NO to all of the above 4 questions.

## 2024-12-11 ENCOUNTER — OUTPATIENT (OUTPATIENT)
Dept: OUTPATIENT SERVICES | Facility: HOSPITAL | Age: 59
LOS: 1 days | End: 2024-12-11
Payer: COMMERCIAL

## 2024-12-11 DIAGNOSIS — Z00.00 ENCOUNTER FOR GENERAL ADULT MEDICAL EXAMINATION WITHOUT ABNORMAL FINDINGS: ICD-10-CM

## 2024-12-11 DIAGNOSIS — Z12.31 ENCOUNTER FOR SCREENING MAMMOGRAM FOR MALIGNANT NEOPLASM OF BREAST: ICD-10-CM

## 2024-12-11 PROCEDURE — 77063 BREAST TOMOSYNTHESIS BI: CPT | Mod: 26

## 2024-12-11 PROCEDURE — 77067 SCR MAMMO BI INCL CAD: CPT

## 2024-12-11 PROCEDURE — 77063 BREAST TOMOSYNTHESIS BI: CPT

## 2024-12-11 PROCEDURE — 77067 SCR MAMMO BI INCL CAD: CPT | Mod: 26

## 2024-12-12 DIAGNOSIS — Z12.31 ENCOUNTER FOR SCREENING MAMMOGRAM FOR MALIGNANT NEOPLASM OF BREAST: ICD-10-CM
